# Patient Record
Sex: FEMALE | Race: AMERICAN INDIAN OR ALASKA NATIVE
[De-identification: names, ages, dates, MRNs, and addresses within clinical notes are randomized per-mention and may not be internally consistent; named-entity substitution may affect disease eponyms.]

---

## 2018-08-25 ENCOUNTER — HOSPITAL ENCOUNTER (INPATIENT)
Dept: HOSPITAL 31 - C.EROB | Age: 28
LOS: 3 days | Discharge: HOME | End: 2018-08-28
Attending: OBSTETRICS & GYNECOLOGY | Admitting: OBSTETRICS & GYNECOLOGY
Payer: COMMERCIAL

## 2018-08-25 VITALS — BODY MASS INDEX: 34.4 KG/M2

## 2018-08-25 DIAGNOSIS — Z3A.39: ICD-10-CM

## 2018-08-26 LAB
ALBUMIN SERPL-MCNC: 3.7 G/DL (ref 3.5–5)
ALBUMIN/GLOB SERPL: 1.1 {RATIO} (ref 1–2.1)
ALT SERPL-CCNC: 17 U/L (ref 9–52)
AST SERPL-CCNC: 17 U/L (ref 14–36)
BASOPHILS # BLD AUTO: 0.1 K/UL (ref 0–0.2)
BASOPHILS NFR BLD: 0.4 % (ref 0–2)
BILIRUB UR-MCNC: NEGATIVE MG/DL
BUN SERPL-MCNC: 7 MG/DL (ref 7–17)
CALCIUM SERPL-MCNC: 8.8 MG/DL (ref 8.6–10.4)
EOSINOPHIL # BLD AUTO: 0.1 K/UL (ref 0–0.7)
EOSINOPHIL NFR BLD: 0.8 % (ref 0–4)
ERYTHROCYTE [DISTWIDTH] IN BLOOD BY AUTOMATED COUNT: 14.4 % (ref 11.5–14.5)
GFR NON-AFRICAN AMERICAN: > 60
GLUCOSE UR STRIP-MCNC: NORMAL MG/DL
HGB BLD-MCNC: 11.5 G/DL (ref 11–16)
LEUKOCYTE ESTERASE UR-ACNC: (no result) LEU/UL
LYMPHOCYTES # BLD AUTO: 1.7 K/UL (ref 1–4.3)
LYMPHOCYTES NFR BLD AUTO: 11.9 % (ref 20–40)
MCH RBC QN AUTO: 25.9 PG (ref 27–31)
MCHC RBC AUTO-ENTMCNC: 33.6 G/DL (ref 33–37)
MCV RBC AUTO: 77.2 FL (ref 81–99)
MONOCYTES # BLD: 0.9 K/UL (ref 0–0.8)
MONOCYTES NFR BLD: 6.1 % (ref 0–10)
NEUTROPHILS # BLD: 11.4 K/UL (ref 1.8–7)
NEUTROPHILS NFR BLD AUTO: 80.8 % (ref 50–75)
NRBC BLD AUTO-RTO: 0 % (ref 0–2)
PH UR STRIP: 6 [PH] (ref 5–8)
PLATELET # BLD: 273 K/UL (ref 130–400)
PMV BLD AUTO: 8.1 FL (ref 7.2–11.7)
PROT UR STRIP-MCNC: NEGATIVE MG/DL
RBC # BLD AUTO: 4.43 MIL/UL (ref 3.8–5.2)
RBC # UR STRIP: (no result) /UL
SP GR UR STRIP: 1 (ref 1–1.03)
SQUAMOUS EPITHIAL: 8 /HPF (ref 0–5)
UROBILINOGEN UR-MCNC: NORMAL MG/DL (ref 0.2–1)
WBC # BLD AUTO: 14 K/UL (ref 4.8–10.8)

## 2018-08-26 PROCEDURE — 0HQ9XZZ REPAIR PERINEUM SKIN, EXTERNAL APPROACH: ICD-10-PCS | Performed by: OBSTETRICS & GYNECOLOGY

## 2018-08-26 NOTE — OBPN
===========================

Datetime: 08/26/2018 10:56

===========================

   

IP Progress Impression:  Normal progression of labor

IP Informed Consent Obtain:  Vaginal Delivery

IP Procedures:  Sterile Vag Exam

IP Progress Plan:  Continue present management; Augmentation; Anticipate Vaginal Delivery

Membranes, Provider:  Intact

FHR - Baseline A Provider:  160's

Gestation - Est Wks by US:  39.0

Fetal Presentation-Admit:  Vertex

IP Progress Note Comment:  CTSP re FHT's at 160-170 and pt feeling pressure

   UC's Q 2-3 mins with Pitocin at 10 mU/min

   SVE 9+/100/0-+1 and BBOW

   Pitocin decreased to 8 and pt on LLP

   Dr. Baltazar aware and on her way

Vital Signs Provider:  Reviewed

NICHD Accel Fetus A IP Provider:  10X10

FHR Category Provider Fetus A:  Category II

NICHD Variability Prov Fetus A:  Moderate 6-25bpm

NICHD Decel Fetus A IP Provider:  Variable

   

===========================

Datetime: 08/26/2018 02:15

===========================

   

Contraction Comments Provider:  3-4

Dilatation, Provider:  3

Effacement, Provider:  90

Station, Provider:  -1

## 2018-08-26 NOTE — OBADHP
===========================

Datetime: 08/26/2018 02:15

===========================

   

Admit Comment, IP Provider:  26 yo female G1 with an IUP at 39 weeks

   Presented with c/o of contractions and vaginal bleeding which tuned out to be mild spotting

   Admitted to adequate FM, Denies LOF

   GBS Negative and Rh+

   After ambulating, there was change in cervical exam and will admit patient 

   Anticipate vaginal delivery

   PM and PSHx: Negative

   Meds: PNV

   NKDA

   Social Negative x 3

   Pregnancy uneventful

   Will admit 

   Will order IV, draw labs and Monitor

      

Pelvic Type - PN:  Adequate

Extremities - PN:  Normal

Abdomen - PN:  Normal

Back - PN:  Normal

Breast - PN:  Not Done

Lungs - PN:  Normal

Heart - PN:  Normal

Thyroid - PN:  Normal

Neurologic - PN:  Normal

HEENT - PN:  Normal

General - PN:  Normal

Fetal Presentation-Admit:  Vertex

FHR - Baseline A Provider:  160

Membranes, Provider:  Intact

Contraction Comments Provider:  3-4

Gestation - Est Wks by US:  39.0

Vital Signs Provider:  Reviewed; Within Normal Limits

IP Chief Complaint:  Uterine contractions; Maternal discomfort; Fetal evaluation

NICHD Variability Prov Fetus A:  Moderate 6-25bpm

NICHD Accel Fetus A IP Provider:  10X10

NICHD Decel Fetus A IP Provider:  None

Dilatation, Provider:  3

Effacement, Provider:  90

Station, Provider:  -1

Genitourinary Exam:  Normal

DTRs - PN:  Normal

EGA AdmitDate IP:  39.0

IP Adm Impression:  Term, intrauterine pregnancy; Active labor; Intact Membranes

IP Admit Plan:  Admit to unit; Initiate labor protocol

   

===========================

Datetime: 08/26/2018 00:30

===========================

   

IP Prenatal Hx Assessment:  Prenatal Records reviewed

FHR Category Provider Fetus A:  Category I

## 2018-08-26 NOTE — OBPN
===========================

Datetime: 2018 11:15

===========================

   

IP Progress Impression:  Normal progression of labor; Non-reassuring fetal heart rate

IP Informed Consent Obtain:  Vaginal Delivery

IP Progress Plan:  Continue present management; Anticipate Vaginal Delivery

Membranes, Provider:  Ruptured

Amniotic Fluid Color, Provider:  Clear

Contraction Comments Provider:  q 2-3

FHR - Baseline A Provider:  170

Gestation - Est Wks by US:  39.0

Fetal Presentation-Admit:  Vertex

IP Progress Note Comment:  pt seen and examiend for progersiosn of labor

   variable deceration , SROM . pt reporistion left lateral IVH, 

      

   VS

   VE ;9.5/100/-1

   EM: 170/mod ziyad, variable 

   TOCO: q 2-3 min

      

   A/P  @ 39 wks in labor

   oxygeb, left lateral IVH

   reexamine

   antiticae pushign at 10cm

Vital Signs Provider:  Reviewed; Within Normal Limits

NICHD Variability Prov Fetus A:  Moderate 6-25bpm

Dilatation, Provider:  9

Effacement, Provider:  100

Station, Provider:  -1

NICHD Decel Fetus A IP Provider:  Variable

## 2018-08-26 NOTE — OBDS
===================================

DELIVERY PERSONNEL

===================================

   

Nurse Midwife Certified:  N/A

Delivery Doctor:  EVAN Baltazar MD

Scrub Nurse:  N/A

Anesthesiologist:  Dr. Musa

Anesthetist:  N/A

Resident:  N/A

   

===================================

MATERNAL INFORMATION

===================================

   

Delivery Anesthesia:  Epidural

Placenta Cultured:  No

Maternal Complications:  None

Provider Comments:  pt was fully dilated and pushing, aturmtaic, spontaneous delivery of head in rosalie 
positi. loose nuchal cord x1 reduced. Atraumtic, spontaneous le7zzfilt of anteiro followed by postei
r shulder followed by delivey of arriaga. both oral and nasal passages of the baby were bubl scutioned. 
umicla cord clamped adn cut. baby handed to [facility] on abdomen wiht rn assistnace. cord blood and cord 
gases collected ans sext x 2. Spotneou delivery of intact placenta with membrna. fundus firm. good he
msotias, superficial first degree pereinal laceration noted and repaired iwth 3-0 chormic, good hemos
tasis, no complicaitnl

      

   live femlae infnat

   agpars 9, 9

   weight of 6lbs 3ounce

   ebl 300ml

   no cmpliaiotns

   

===================================

LABOR SUMMARY

===================================

   

EDC:  2018 00:00

No. Babies in Womb:  1

 Attempted:  No

Labor Anesthesia:  Epidural

   

===================================

LABOR INFORMATION

===================================

   

Reason for Induction:  Not Applicable

Reason for Induction Other:  N/A

Onset of Labor:  2018 21:00

Other Ripening Agents:  N/A

Oxytocin:  Augmentation

Group B Beta Strep:  Negative

Antibiotics # of Doses:  N/A

Antibiotics Time of Last Dose:  N/A

Steroids Given:  None

Reason Steroids Not Administered:  Not Applicable

Other Reason Not Administered:  N/A

   

===================================

MEMBRANES

===================================

   

Membranes Rupture Method:  Spontaneous

Rupture of Membranes:  2018 11:09

Length of Rupture (hrs):  0.62

Amniotic Fluid Color:  Clear

Amniotic Fluid Amount:  Moderate

Amniotic Fluid Odor:  Normal

   

===================================

STAGES OF LABOR

===================================

   

Stage 3 hrs:  0

Stage 3 min:  9

Total Time in Labor hrs:  14

Total Time in Labor min:  55

   

===================================

VAGINAL DELIVERY

===================================

   

Laceration Extension:  First Degree

Laceration Type:  Perineal

Laceration Repair:  Yes

Initial Vag Sponge Count:  10

Initial Vag Sharps Count:  2

   

===================================

BABY A INFORMATION

===================================

   

Infant Delivery Date/Time:  2018 11:46

Method of Delivery:  Vaginal

Born in Route :  No

:  N/A

Forceps:  N/A

Vacuum Extraction:  N/A

Shoulder Dystocia :  No

   

===================================

SHOULDER DYSTOCIA BABY A

===================================

   

Infant Delivery Date/Time:  2018 11:46

   

===================================

PRESENTATION/POSITION BABY A

===================================

   

Presentation:  Cephalic

Cephalic Presentation:  Vertex

Vertex Position:  Left Occipital Anterior

Breech Presentation:  N/A

   

===================================

PLACENTA INFORMATION BABY A

===================================

   

Placenta Delivery Time :  2018 11:55

Placenta Method of Delivery:  Spontaneous

Placenta Status:  Delivered

   

===================================

INFANT INFORMATION BABY A

===================================

   

Gestational Age at Delivery:  39.0

Gestational Status:  Term

Infant Outcome :  Liveborn

Infant Condition :  Stable

Infant Sex:  Female

   

===================================

IDENTIFICATION/MEDS BABY A

===================================

   

ID Band Number:  [id number]

Sensor Number:  [id number]

   

===================================

WEIGHT/LENGTH BABY A

===================================

   

Infant Birthweight (gms):  2815

Infant Weight (lb):  6

Infant Weight (oz):  3

Infant Length Inches:  18.50

Infant Length cms:  47.0

   

===================================

CORD INFORMATION BABY A

===================================

   

No. Cord Vessels:  3

Nuchal Cord :  Around Neck x1, Loose

Nuchal Cord Other:  N/A

True Knot:  N/A

Cord Blood Taken:  Yes

Banking/Donate Info:  N/A

Infant Suction:  Mouth; Nose

## 2018-08-26 NOTE — OBHP
===========================

Datetime: 08/26/2018 02:15

===========================

   

IP Adm Impression:  Term, intrauterine pregnancy; Active labor; Intact Membranes

EGA AdmitDate IP:  39.0

IP Indication for Induction:  Not Applicable

IP Chief Complaint:  Uterine contractions; Maternal discomfort; Fetal evaluation

   

===========================

Datetime: 08/26/2018 00:30

===========================

   

IP Admit Plan:  Observation/Evaluation

Admit Comment, IP Provider:  28 yo female G1 with an IUP at 39 weeks

   Presented with c/o of contractions and vaginal bleeding which tuned out to be mild spotting

   Admitted to adequate FM, Denies LOF

   GBS Negative and Rh+

   NST reactive and will ambulate for a while and reassess

Pelvic Type - PN:  Adequate

Extremities - PN:  Normal

Abdomen - PN:  Normal

Back - PN:  Normal

Breast - PN:  Not Done

Lungs - PN:  Normal

Heart - PN:  Normal

Thyroid - PN:  Normal

Neurologic - PN:  Normal

HEENT - PN:  Normal

General - PN:  Normal

Fetal Presentation-Admit:  Vertex

FHR - Baseline A Provider:  150

Membranes, Provider:  Intact

Contraction Comments Provider:  Q 3-4

Gestation - Est Wks by US:  39.0

IP Prenatal Hx Assessment:  Prenatal Records reviewed

Vital Signs Provider:  Reviewed

NICHD Variability Prov Fetus A:  Moderate 6-25bpm

NICHD Accel Fetus A IP Provider:  10X10

FHR Category Provider Fetus A:  Category I

NICHD Decel Fetus A IP Provider:  None

Dilatation, Provider:  2

Effacement, Provider:  50

Station, Provider:  -1

Genitourinary Exam:  Normal

DTRs - PN:  Normal

## 2018-08-27 VITALS — RESPIRATION RATE: 20 BRPM

## 2018-08-27 LAB
BASOPHILS # BLD AUTO: 0.1 K/UL (ref 0–0.2)
BASOPHILS NFR BLD: 0.5 % (ref 0–2)
EOSINOPHIL # BLD AUTO: 0.2 K/UL (ref 0–0.7)
EOSINOPHIL NFR BLD: 1.4 % (ref 0–4)
ERYTHROCYTE [DISTWIDTH] IN BLOOD BY AUTOMATED COUNT: 14.3 % (ref 11.5–14.5)
HGB BLD-MCNC: 10.7 G/DL (ref 11–16)
LYMPHOCYTES # BLD AUTO: 1.8 K/UL (ref 1–4.3)
LYMPHOCYTES NFR BLD AUTO: 14.1 % (ref 20–40)
MCH RBC QN AUTO: 26.1 PG (ref 27–31)
MCHC RBC AUTO-ENTMCNC: 33.4 G/DL (ref 33–37)
MCV RBC AUTO: 78.2 FL (ref 81–99)
MONOCYTES # BLD: 1.1 K/UL (ref 0–0.8)
MONOCYTES NFR BLD: 8.3 % (ref 0–10)
NEUTROPHILS # BLD: 9.7 K/UL (ref 1.8–7)
NEUTROPHILS NFR BLD AUTO: 75.7 % (ref 50–75)
NRBC BLD AUTO-RTO: 0 % (ref 0–2)
PLATELET # BLD: 240 K/UL (ref 130–400)
PMV BLD AUTO: 7.9 FL (ref 7.2–11.7)
RBC # BLD AUTO: 4.08 MIL/UL (ref 3.8–5.2)
WBC # BLD AUTO: 12.8 K/UL (ref 4.8–10.8)

## 2018-08-27 RX ADMIN — Medication SCH TAB: at 09:43

## 2018-08-27 NOTE — OBPPN
===========================

Datetime: 2018 00:25

===========================

   

PP Pain Prov:  Within normal limits

PP Nausea Prov:  Denies

PP Flatus Prov:  Yes

PP BM Prov:  No

PP Breasts Prov:  Normal

PP Heart Prov:  Normal

PP Lungs Prov:  Normal

PP Abdomen/Uterus Prov:  Normal

PP Lochia Prov:  Normal

PP Vulva/Perineum Prov:  Normal

PP CVA Tenderness Prov:  Normal

PP Extremities Prov:  Normal

PP C/S Incision Prov:  Not Applicable

PP Breastfeeding Progress Prov:  Not Applicable

PP Comments Phys Exam Prov:  abd: soft, NT, ND

   Fundus: firm, below level of umbiucs

   VE: minimal lochia, non foul smelling

   EXT: no calt tnedere b/l, negative francy sing

PP Impression Prov:  Normal postpartum progression

PP Plan Prov:  Continue present management

PP Progress Note Prov:  pt seen and examiend no compliant. pt reprots pain contorlled. pt is ambaitng
, ovidng, pasisgn flauts, bottel feedign, light bleeding, no fever, chills, nuas,e ovmiting, cp,s ob.
 pt does not want to bresat feed despite being aware of benefits.

      

   VSS

   PE see above

      

   A/P s/p  PPD #1 doign well

   am labs

   pain manmgent

   regul;ar diet

   encourge amabitn

Vital Signs Provider PP:  Reviewed; Within Normal Limits

## 2018-08-27 NOTE — OBDCSUM
===========================

Datetime: 08/27/2018 00:27

===========================

   

Discharged to, Provider:  Home

Follow up at, Provider:  Dr Baltazar

Disch Instr Activity:  Normal activity

Disch Instr Diet:  Regular

Discharge Instructions, Provider:  Routine instructions given

Discharge Diagnosis, Provider:  Term Pregnancy Delivered

Discharge Time:  08/28/2018 11:00

Follow up in weeks, Provider:  6 weeks

Disch Referrals:  None

Contraception discussed, Prov:  Yes

Disch Activity Restrictions:  No sexual activity; Nothing in vagina - [place], tampons, douche

Discharge Comment, Provider:  WAN 8/28 AM 

   kyle hess

## 2018-08-28 VITALS
SYSTOLIC BLOOD PRESSURE: 111 MMHG | DIASTOLIC BLOOD PRESSURE: 78 MMHG | HEART RATE: 86 BPM | OXYGEN SATURATION: 97 % | TEMPERATURE: 98.1 F

## 2018-08-28 RX ADMIN — Medication SCH TAB: at 10:43
